# Patient Record
Sex: FEMALE | Race: WHITE
[De-identification: names, ages, dates, MRNs, and addresses within clinical notes are randomized per-mention and may not be internally consistent; named-entity substitution may affect disease eponyms.]

---

## 2022-01-11 ENCOUNTER — HOSPITAL ENCOUNTER (EMERGENCY)
Dept: HOSPITAL 26 - MED | Age: 32
Discharge: HOME | End: 2022-01-11
Payer: COMMERCIAL

## 2022-01-11 VITALS — SYSTOLIC BLOOD PRESSURE: 160 MMHG | DIASTOLIC BLOOD PRESSURE: 97 MMHG

## 2022-01-11 VITALS — HEIGHT: 62 IN | WEIGHT: 182 LBS | BODY MASS INDEX: 33.49 KG/M2

## 2022-01-11 VITALS — SYSTOLIC BLOOD PRESSURE: 193 MMHG | DIASTOLIC BLOOD PRESSURE: 108 MMHG

## 2022-01-11 DIAGNOSIS — Z98.890: ICD-10-CM

## 2022-01-11 DIAGNOSIS — G51.0: Primary | ICD-10-CM

## 2022-01-11 DIAGNOSIS — Z79.899: ICD-10-CM

## 2022-01-11 PROCEDURE — 99283 EMERGENCY DEPT VISIT LOW MDM: CPT

## 2022-01-11 NOTE — NUR
32yo f c/o left-sided facial and neck numbness x few hours. patient admits to 
slurring of speech. denies weakness, denies pain. patient denies being under 
any stress lately. in ed, aox4. gcs15. pt hypertensive at 193/108. all other vs 
stable. patient with facial assymetry, unable to close left eye when asked to 
close both eyes. tongue at midline. upper and lower extremity strength 5/5. pt 
positioned in bed comfortably with 2 siderails up. ermd made aware of pt 
status.









pmh: eclampsia

meds: none

nka

## 2022-01-11 NOTE — NUR
Patient discharged with v/s stable. Written and verbal after care instructions 
given and explained. 

Patient alert, oriented and verbalized understanding of instructions. 
Ambulatory with steady gait. All questions addressed prior to discharge. ID 
band removed. Patient advised to follow up with PMD. Rx of MINERAL OIL, 
ARTIFICIAL TEARS, PREDNISON, VALACYCLOVIR given. Patient educated on indication 
of medication including possible reaction and side effects. Opportunity to ask 
questions provided and answered.